# Patient Record
Sex: MALE | URBAN - METROPOLITAN AREA
[De-identification: names, ages, dates, MRNs, and addresses within clinical notes are randomized per-mention and may not be internally consistent; named-entity substitution may affect disease eponyms.]

---

## 2022-12-29 ENCOUNTER — OFFICE VISIT (OUTPATIENT)
Dept: URBAN - METROPOLITAN AREA CLINIC 12 | Facility: CLINIC | Age: 32
End: 2022-12-29
Payer: COMMERCIAL

## 2022-12-29 VITALS
HEIGHT: 67 IN | OXYGEN SATURATION: 95 % | BODY MASS INDEX: 19.62 KG/M2 | HEART RATE: 75 BPM | SYSTOLIC BLOOD PRESSURE: 118 MMHG | DIASTOLIC BLOOD PRESSURE: 74 MMHG | TEMPERATURE: 97.8 F | WEIGHT: 125 LBS

## 2022-12-29 DIAGNOSIS — K51.80 OTHER ULCERATIVE COLITIS WITHOUT COMPLICATION: ICD-10-CM

## 2022-12-29 PROCEDURE — 99203 OFFICE O/P NEW LOW 30 MIN: CPT | Performed by: INTERNAL MEDICINE

## 2022-12-29 RX ORDER — AZATHIOPRINE 75 MG/1
AS DIRECTED TABLET ORAL DAILY
Qty: 30 | Refills: 11 | OUTPATIENT
Start: 2022-12-29 | End: 2023-12-24

## 2022-12-29 RX ORDER — AZATHIOPRINE 75 MG/1
AS DIRECTED TABLET ORAL
Status: ON HOLD | COMMUNITY

## 2022-12-29 RX ORDER — MESALAMINE 1.2 G/1
2 TABLETS WITH A MEAL TABLET, DELAYED RELEASE ORAL TWICE A DAY
Qty: 120 TABLET | Refills: 11 | OUTPATIENT
Start: 2022-12-29 | End: 2023-12-24

## 2022-12-29 NOTE — HPI-ULCERATIVE COLITIS
Patient is here for Ulcerative Colitis consultation.  Patient resides in [place] and is here for job.  Patient was diagnosed with Ulcerative Colitis in [place].  Patient was maintained on Imuran 75mg and Rowasa enemas.  Otherwise, patient states being in remission.  Patient currently denies any fever, chills, nausea or vomiting.  Patient denies any diarrhea or constipation.  Patient denies any melena or rectal bleeding.  Patient states good appetite and stable weight.

## 2023-01-21 ENCOUNTER — WEB ENCOUNTER (OUTPATIENT)
Dept: URBAN - METROPOLITAN AREA CLINIC 12 | Facility: CLINIC | Age: 33
End: 2023-01-21

## 2023-01-23 ENCOUNTER — WEB ENCOUNTER (OUTPATIENT)
Dept: URBAN - METROPOLITAN AREA CLINIC 111 | Facility: CLINIC | Age: 33
End: 2023-01-23

## 2023-01-24 ENCOUNTER — WEB ENCOUNTER (OUTPATIENT)
Dept: URBAN - METROPOLITAN AREA CLINIC 111 | Facility: CLINIC | Age: 33
End: 2023-01-24

## 2023-02-06 ENCOUNTER — WEB ENCOUNTER (OUTPATIENT)
Dept: URBAN - METROPOLITAN AREA CLINIC 111 | Facility: CLINIC | Age: 33
End: 2023-02-06

## 2023-02-09 ENCOUNTER — DASHBOARD ENCOUNTERS (OUTPATIENT)
Age: 33
End: 2023-02-09

## 2023-02-09 ENCOUNTER — OFFICE VISIT (OUTPATIENT)
Dept: URBAN - METROPOLITAN AREA CLINIC 12 | Facility: CLINIC | Age: 33
End: 2023-02-09

## 2023-02-09 ENCOUNTER — OFFICE VISIT (OUTPATIENT)
Dept: URBAN - METROPOLITAN AREA CLINIC 111 | Facility: CLINIC | Age: 33
End: 2023-02-09
Payer: COMMERCIAL

## 2023-02-09 VITALS
HEIGHT: 67 IN | SYSTOLIC BLOOD PRESSURE: 106 MMHG | BODY MASS INDEX: 20.56 KG/M2 | HEART RATE: 89 BPM | DIASTOLIC BLOOD PRESSURE: 66 MMHG | TEMPERATURE: 96.6 F | WEIGHT: 131 LBS

## 2023-02-09 DIAGNOSIS — K51.80 OTHER ULCERATIVE COLITIS WITHOUT COMPLICATION: ICD-10-CM

## 2023-02-09 PROCEDURE — 99213 OFFICE O/P EST LOW 20 MIN: CPT | Performed by: NURSE PRACTITIONER

## 2023-02-09 RX ORDER — MESALAMINE 1.2 G/1
2 TABLETS WITH A MEAL TABLET, DELAYED RELEASE ORAL TWICE A DAY
Qty: 120 TABLET | Refills: 1

## 2023-02-09 RX ORDER — AZATHIOPRINE 75 MG/1
AS DIRECTED TABLET ORAL DAILY
Qty: 30 | Refills: 11 | Status: ACTIVE | COMMUNITY
Start: 2022-12-29 | End: 2023-12-24

## 2023-02-09 RX ORDER — AZATHIOPRINE 75 MG/1
AS DIRECTED TABLET ORAL
Status: ON HOLD | COMMUNITY

## 2023-02-09 RX ORDER — MESALAMINE 1.2 G/1
2 TABLETS WITH A MEAL TABLET, DELAYED RELEASE ORAL TWICE A DAY
Qty: 120 TABLET | Refills: 11 | Status: ACTIVE | COMMUNITY
Start: 2022-12-29 | End: 2023-12-24

## 2023-02-09 NOTE — HPI-ULCERATIVE COLITIS
31 yo [place]ian-speaking male with pmh UC presents for follow up. Patient was last seen by Dr. Palomares on 12/29/22 for UC. He is currently in [place] for [place]. He was dx w UC in [place], was taking Imuran 75mg and Rowasa enemas.  He started azathioprine and budesonide foam since last visit. He didn't start mesalamine as the cost was too high. Pt is feeling better since last visit. Has loose bm 3-4 times daily and occasional blood in stool. Denies fever, chills, abd pain, nausea, vomiting, melena or weight loss. He generally feels well. Reports last colonoscopy 3 years ago in [place].

## 2023-02-10 LAB
A/G RATIO: 1.3
ALBUMIN: 4.1
ALKALINE PHOSPHATASE: 63
ALT (SGPT): 10
AST (SGOT): 12
BILIRUBIN, TOTAL: 0.5
BUN/CREATININE RATIO: (no result)
BUN: 7
C-REACTIVE PROTEIN, QUANT: 2.9
CALCIUM: 8.9
CARBON DIOXIDE, TOTAL: 27
CHLORIDE: 105
CREATININE: 0.65
EGFR: 127
GLOBULIN, TOTAL: 3.2
GLUCOSE: 84
HEMATOCRIT: 40.1
HEMOGLOBIN: 13.1
MCH: 29.1
MCHC: 32.7
MCV: 89.1
MPV: 9
PLATELET COUNT: 350
POTASSIUM: 4.4
PROTEIN, TOTAL: 7.3
RDW: 13.1
RED BLOOD CELL COUNT: 4.5
SED RATE BY MODIFIED: 9
SODIUM: 140
WHITE BLOOD CELL COUNT: 4.5

## 2023-03-05 ENCOUNTER — WEB ENCOUNTER (OUTPATIENT)
Dept: URBAN - METROPOLITAN AREA CLINIC 111 | Facility: CLINIC | Age: 33
End: 2023-03-05

## 2023-03-05 RX ORDER — MESALAMINE 1.2 G/1
2 TABLETS WITH A MEAL TABLET, DELAYED RELEASE ORAL TWICE A DAY
Qty: 120 TABLET | Refills: 1
End: 2023-05-06

## 2023-03-07 PROBLEM — 64766004: Status: ACTIVE | Noted: 2022-12-29
